# Patient Record
Sex: FEMALE | Race: BLACK OR AFRICAN AMERICAN | NOT HISPANIC OR LATINO | URBAN - METROPOLITAN AREA
[De-identification: names, ages, dates, MRNs, and addresses within clinical notes are randomized per-mention and may not be internally consistent; named-entity substitution may affect disease eponyms.]

---

## 2021-08-28 ENCOUNTER — TELEPHONE ENCOUNTER (OUTPATIENT)
Dept: URBAN - METROPOLITAN AREA CLINIC 13 | Facility: CLINIC | Age: 46
End: 2021-08-28

## 2021-08-29 ENCOUNTER — TELEPHONE ENCOUNTER (OUTPATIENT)
Dept: URBAN - METROPOLITAN AREA CLINIC 13 | Facility: CLINIC | Age: 46
End: 2021-08-29

## 2021-11-03 ENCOUNTER — HOSPITAL ENCOUNTER (OUTPATIENT)
Dept: HOSPITAL 5 - WOUND | Age: 46
Discharge: HOME | End: 2021-11-03
Attending: SURGERY
Payer: COMMERCIAL

## 2021-11-03 DIAGNOSIS — T81.31XA: Primary | ICD-10-CM

## 2021-11-03 DIAGNOSIS — K21.9: ICD-10-CM

## 2021-11-03 DIAGNOSIS — Z87.891: ICD-10-CM

## 2021-11-03 DIAGNOSIS — S31.105A: ICD-10-CM
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
## 2021-11-03 DIAGNOSIS — I10: ICD-10-CM

## 2021-11-03 DIAGNOSIS — F41.9: ICD-10-CM

## 2021-11-03 DIAGNOSIS — E78.5: ICD-10-CM
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
## 2021-11-03 DIAGNOSIS — J45.909: ICD-10-CM
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
## 2021-11-03 PROCEDURE — 11045 DBRDMT SUBQ TISS EACH ADDL: CPT

## 2021-11-03 PROCEDURE — 11042 DBRDMT SUBQ TIS 1ST 20SQCM/<: CPT

## 2021-11-03 PROCEDURE — G0463 HOSPITAL OUTPT CLINIC VISIT: HCPCS

## 2021-11-03 PROCEDURE — 99204 OFFICE O/P NEW MOD 45 MIN: CPT

## 2021-11-05 ENCOUNTER — HOSPITAL ENCOUNTER (OUTPATIENT)
Dept: HOSPITAL 5 - WOUND | Age: 46
Discharge: HOME | End: 2021-11-05
Attending: SURGERY
Payer: COMMERCIAL

## 2021-11-05 DIAGNOSIS — J45.909: ICD-10-CM

## 2021-11-05 DIAGNOSIS — F41.9: ICD-10-CM

## 2021-11-05 DIAGNOSIS — E78.5: ICD-10-CM

## 2021-11-05 DIAGNOSIS — S31.105D: ICD-10-CM

## 2021-11-05 DIAGNOSIS — T81.31XD: Primary | ICD-10-CM

## 2021-11-05 DIAGNOSIS — K21.9: ICD-10-CM
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
## 2021-11-05 DIAGNOSIS — I10: ICD-10-CM

## 2021-11-05 DIAGNOSIS — Z87.891: ICD-10-CM
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
## 2021-11-05 PROCEDURE — 99213 OFFICE O/P EST LOW 20 MIN: CPT

## 2021-11-05 PROCEDURE — G0463 HOSPITAL OUTPT CLINIC VISIT: HCPCS

## 2021-11-08 ENCOUNTER — HOSPITAL ENCOUNTER (OUTPATIENT)
Dept: HOSPITAL 5 - WOUND | Age: 46
Discharge: HOME | End: 2021-11-08
Attending: SURGERY
Payer: COMMERCIAL

## 2021-11-08 DIAGNOSIS — E78.5: ICD-10-CM

## 2021-11-08 DIAGNOSIS — Z87.891: ICD-10-CM

## 2021-11-08 DIAGNOSIS — J45.909: ICD-10-CM

## 2021-11-08 DIAGNOSIS — T81.31XD: Primary | ICD-10-CM

## 2021-11-08 DIAGNOSIS — S31.104D: ICD-10-CM

## 2021-11-08 DIAGNOSIS — F41.9: ICD-10-CM

## 2021-11-08 DIAGNOSIS — I10: ICD-10-CM
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
## 2021-11-08 DIAGNOSIS — S31.105D: ICD-10-CM
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
## 2021-11-08 DIAGNOSIS — K21.9: ICD-10-CM

## 2023-03-22 ENCOUNTER — OFFICE VISIT (OUTPATIENT)
Dept: URBAN - METROPOLITAN AREA CLINIC 88 | Facility: CLINIC | Age: 48
End: 2023-03-22

## 2023-03-27 ENCOUNTER — OFFICE VISIT (OUTPATIENT)
Dept: URBAN - METROPOLITAN AREA CLINIC 84 | Facility: CLINIC | Age: 48
End: 2023-03-27

## 2023-04-19 ENCOUNTER — OFFICE VISIT (OUTPATIENT)
Dept: URBAN - METROPOLITAN AREA CLINIC 84 | Facility: CLINIC | Age: 48
End: 2023-04-19

## 2023-04-19 RX ORDER — SERTRALINE 100 MG/1
TABLET, FILM COATED ORAL
Qty: 90 TABLET | Status: ACTIVE | COMMUNITY

## 2023-04-19 RX ORDER — VALSARTAN AND HYDROCHLOROTHIAZIDE 160; 25 MG/1; MG/1
TABLET, FILM COATED ORAL
Qty: 90 TABLET | Status: ACTIVE | COMMUNITY

## 2023-04-19 RX ORDER — ALBUTEROL SULFATE 2.5 MG/3ML
SOLUTION RESPIRATORY (INHALATION)
Qty: 180 MILLILITER | Status: ACTIVE | COMMUNITY

## 2023-04-19 RX ORDER — FERROUS SULFATE TAB 325 MG (65 MG ELEMENTAL FE) 325 (65 FE) MG
TAB ORAL
Qty: 30 TABLET | Status: ACTIVE | COMMUNITY

## 2023-04-19 RX ORDER — ATORVASTATIN CALCIUM 20 MG/1
TABLET ORAL
Qty: 14 TABLET | Status: ACTIVE | COMMUNITY

## 2023-04-19 RX ORDER — AMLODIPINE BESYLATE 10 MG/1
TABLET ORAL
Qty: 90 TABLET | Status: ACTIVE | COMMUNITY

## 2023-04-19 RX ORDER — LORATADINE 10 MG/1
TABLET ORAL
Qty: 30 TABLET | Status: ACTIVE | COMMUNITY

## 2023-06-20 ENCOUNTER — OFFICE VISIT (OUTPATIENT)
Dept: URBAN - METROPOLITAN AREA CLINIC 84 | Facility: CLINIC | Age: 48
End: 2023-06-20

## 2023-06-22 ENCOUNTER — OFFICE VISIT (OUTPATIENT)
Dept: URBAN - METROPOLITAN AREA SURGERY CENTER 20 | Facility: SURGERY CENTER | Age: 48
End: 2023-06-22

## 2023-07-03 ENCOUNTER — DASHBOARD ENCOUNTERS (OUTPATIENT)
Age: 48
End: 2023-07-03

## 2023-07-03 ENCOUNTER — OFFICE VISIT (OUTPATIENT)
Dept: URBAN - METROPOLITAN AREA TELEHEALTH 2 | Facility: TELEHEALTH | Age: 48
End: 2023-07-03
Payer: COMMERCIAL

## 2023-07-03 DIAGNOSIS — Z12.11 SCREENING COLONOSCOPY: ICD-10-CM

## 2023-07-03 DIAGNOSIS — K21.9 GERD: ICD-10-CM

## 2023-07-03 PROCEDURE — 99202 OFFICE O/P NEW SF 15 MIN: CPT | Performed by: INTERNAL MEDICINE

## 2023-07-03 RX ORDER — OMEPRAZOLE 40 MG/1
1 CAPSULE 30 MINUTES BEFORE MORNING MEAL CAPSULE, DELAYED RELEASE ORAL ONCE A DAY
Qty: 30 | Refills: 5 | OUTPATIENT
Start: 2023-07-03

## 2023-07-03 RX ORDER — ALBUTEROL SULFATE 2.5 MG/3ML
SOLUTION RESPIRATORY (INHALATION)
Qty: 180 MILLILITER | Status: ACTIVE | COMMUNITY

## 2023-07-03 RX ORDER — AMLODIPINE BESYLATE 10 MG/1
TABLET ORAL
Qty: 90 TABLET | Status: ACTIVE | COMMUNITY

## 2023-07-03 RX ORDER — ATORVASTATIN CALCIUM 20 MG/1
TABLET ORAL
Qty: 14 TABLET | Status: ACTIVE | COMMUNITY

## 2023-07-03 RX ORDER — SERTRALINE 100 MG/1
TABLET, FILM COATED ORAL
Qty: 90 TABLET | Status: ACTIVE | COMMUNITY

## 2023-07-03 RX ORDER — LORATADINE 10 MG/1
TABLET ORAL
Qty: 30 TABLET | Status: ACTIVE | COMMUNITY

## 2023-07-03 RX ORDER — VALSARTAN AND HYDROCHLOROTHIAZIDE 160; 25 MG/1; MG/1
TABLET, FILM COATED ORAL
Qty: 90 TABLET | Status: ACTIVE | COMMUNITY

## 2023-07-03 RX ORDER — FERROUS SULFATE TAB 325 MG (65 MG ELEMENTAL FE) 325 (65 FE) MG
TAB ORAL
Qty: 30 TABLET | Status: ACTIVE | COMMUNITY

## 2023-07-03 NOTE — HPI-TODAY'S VISIT:
This patient was referred by  [facility] for an evaluation of colonoscopy.  A copy of this will be sent to the referring provider. Overall the patient has been feeling well.  The patient denies anorexia or weight loss.  In general the patient has irregular BM's.  In general the patient denies constipation, diarrhea, hematochezia, BRBPR, or melena.  She has multiple BM's a day.  This has increased since being on metformin and Ozempic.  She has urgency.  She has loose stool.  The patient denies abdominal pain.  The patient denies GERD/N/V/dysphagia.  She takes Omeprazole daily.  She has a HH.  There is no FHx of colon cancer.  The patient has never had prior colonoscopy

## 2023-07-07 ENCOUNTER — TELEPHONE ENCOUNTER (OUTPATIENT)
Dept: URBAN - METROPOLITAN AREA CLINIC 84 | Facility: CLINIC | Age: 48
End: 2023-07-07

## 2023-07-24 ENCOUNTER — WEB ENCOUNTER (OUTPATIENT)
Dept: URBAN - METROPOLITAN AREA CLINIC 6 | Facility: CLINIC | Age: 48
End: 2023-07-24

## 2023-07-31 ENCOUNTER — TELEPHONE ENCOUNTER (OUTPATIENT)
Dept: URBAN - METROPOLITAN AREA CLINIC 84 | Facility: CLINIC | Age: 48
End: 2023-07-31

## 2023-07-31 ENCOUNTER — TELEPHONE ENCOUNTER (OUTPATIENT)
Dept: URBAN - METROPOLITAN AREA CLINIC 6 | Facility: CLINIC | Age: 48
End: 2023-07-31

## 2023-08-01 ENCOUNTER — TELEPHONE ENCOUNTER (OUTPATIENT)
Dept: URBAN - METROPOLITAN AREA CLINIC 84 | Facility: CLINIC | Age: 48
End: 2023-08-01

## 2023-08-02 ENCOUNTER — OFFICE VISIT (OUTPATIENT)
Dept: URBAN - METROPOLITAN AREA SURGERY CENTER 20 | Facility: SURGERY CENTER | Age: 48
End: 2023-08-02

## 2023-10-04 ENCOUNTER — OFFICE VISIT (OUTPATIENT)
Dept: URBAN - METROPOLITAN AREA MEDICAL CENTER 16 | Facility: MEDICAL CENTER | Age: 48
End: 2023-10-04
Payer: COMMERCIAL

## 2023-10-04 DIAGNOSIS — Z12.11 COLON CANCER SCREENING: ICD-10-CM

## 2023-10-04 PROCEDURE — 45378 DIAGNOSTIC COLONOSCOPY: CPT | Performed by: INTERNAL MEDICINE

## 2023-10-10 NOTE — PHYSICAL EXAM HENT:
Head , normocephalic , atraumatic , Face , Face within normal limits , Ears , External ears within normal limits , Nose/Nasopharynx , External nose  normal appearance , Mouth and Throat , Oral cavity appearance normal 2

## 2024-05-09 ENCOUNTER — ERX REFILL RESPONSE (OUTPATIENT)
Dept: URBAN - METROPOLITAN AREA CLINIC 84 | Facility: CLINIC | Age: 49
End: 2024-05-09

## 2024-05-09 RX ORDER — OMEPRAZOLE 40 MG/1
1 CAPSULE 30 MINUTES BEFORE MORNING MEAL CAPSULE, DELAYED RELEASE ORAL ONCE A DAY
Qty: 30 | Refills: 5 | OUTPATIENT

## 2024-05-09 RX ORDER — OMEPRAZOLE 40 MG/1
TAKE 1 CAPSULE BY MOUTH EVERY DAY 30 MINUTES BEFORE BREAKFAST CAPSULE, DELAYED RELEASE ORAL
Qty: 30 CAPSULE | Refills: 5 | OUTPATIENT

## 2024-12-06 ENCOUNTER — ERX REFILL RESPONSE (OUTPATIENT)
Dept: URBAN - METROPOLITAN AREA CLINIC 84 | Facility: CLINIC | Age: 49
End: 2024-12-06

## 2024-12-06 RX ORDER — OMEPRAZOLE 40 MG/1
TAKE 1 CAPSULE BY MOUTH EVERY DAY 30 MINUTES BEFORE BREAKFAST CAPSULE, DELAYED RELEASE ORAL
Qty: 30 CAPSULE | Refills: 0 | OUTPATIENT

## 2024-12-06 RX ORDER — OMEPRAZOLE 40 MG/1
TAKE 1 CAPSULE BY MOUTH EVERY DAY 30 MINUTES BEFORE BREAKFAST CAPSULE, DELAYED RELEASE ORAL
Qty: 30 CAPSULE | Refills: 5 | OUTPATIENT